# Patient Record
Sex: MALE | Race: WHITE | NOT HISPANIC OR LATINO | URBAN - METROPOLITAN AREA
[De-identification: names, ages, dates, MRNs, and addresses within clinical notes are randomized per-mention and may not be internally consistent; named-entity substitution may affect disease eponyms.]

---

## 2018-07-09 ENCOUNTER — APPOINTMENT (OUTPATIENT)
Dept: GENERAL RADIOLOGY | Facility: CLINIC | Age: 48
End: 2018-07-09
Attending: PHYSICIAN ASSISTANT
Payer: COMMERCIAL

## 2018-07-09 ENCOUNTER — HOSPITAL ENCOUNTER (EMERGENCY)
Facility: CLINIC | Age: 48
Discharge: HOME | End: 2018-07-09
Attending: PHYSICIAN ASSISTANT | Admitting: PHYSICIAN ASSISTANT
Payer: COMMERCIAL

## 2018-07-09 VITALS
BODY MASS INDEX: 32.9 KG/M2 | TEMPERATURE: 99.3 F | OXYGEN SATURATION: 100 % | HEIGHT: 71 IN | SYSTOLIC BLOOD PRESSURE: 153 MMHG | WEIGHT: 235 LBS | DIASTOLIC BLOOD PRESSURE: 100 MMHG | RESPIRATION RATE: 18 BRPM

## 2018-07-09 DIAGNOSIS — T18.108A ESOPHAGEAL FOREIGN BODY, INITIAL ENCOUNTER: ICD-10-CM

## 2018-07-09 PROCEDURE — 71046 X-RAY EXAM CHEST 2 VIEWS: CPT

## 2018-07-09 PROCEDURE — 70360 X-RAY EXAM OF NECK: CPT

## 2018-07-09 PROCEDURE — 99284 EMERGENCY DEPT VISIT MOD MDM: CPT | Mod: 25

## 2018-07-09 PROCEDURE — 99284 EMERGENCY DEPT VISIT MOD MDM: CPT

## 2018-07-09 ASSESSMENT — ENCOUNTER SYMPTOMS
FEVER: 0
CHILLS: 0
VOMITING: 0

## 2018-07-09 NOTE — ED AVS SNAPSHOT
[facility]    [address]    [facility] [address]    Phone:  [phone]    Fax:  [phone]                                       Candido Young   MRN: [id number]    Department:  [facility]   Date of Visit:  7/9/2018           Patient Information     Date Of Birth          1970        Your diagnoses for this visit were:     Esophageal foreign body, initial encounter        You were seen by Eden Leal PA-C.      Follow-up Information     Follow up with Matt Keller MD.    Specialty:  Gastroenterology    Why:  Expect a call tomorrow. Plan for procdure here.    Contact information:    [facility]  [address]  [facility] [address]  [phone]          Follow up with [facility].    Specialty:  EMERGENCY MEDICINE    Why:  If symptoms worsen    Contact information:    [address] [facility] [id number] [phone]        Discharge Instructions       Clear fluids for breakfast tomorrow. Nothing after 7am tomorrow. Expect a call early tomorrow to set up a time for removal. Call the clinic if you do not receive a call for some reason. Return here if you develop a fever, trouble breathing, or any other concerns.       Swallowed Foreign Body (Adult)  Indigestible objects (foreign bodies) are sometimes swallowed by adults. Whether or not the object moves all the way through the digestive tract depends on many factors. This includes the size and shape of the object, whether the object is sharp and pointy, and what the object is made of.  Based on your evaluation, no treatment is needed at this time. The swallowed object is expected to move through your digestive tract and pass out of the body in the stool with no problems. This may take about 24 to 48 hours, but could take longer depending on your bowel habits. If imaging tests were done, you will  be told when the results are ready and if they affect your treatment.  Home care    Follow any instructions from your provider about eating and drinking. In certain cases, you may be told to only eat soft foods and drink liquids for the first 24 to 48 hours.    You will need to check your stool each time you have a bowel movement. This is so you can confirm that the object has passed, and look for signs of bleeding. If the object does not pass, it may mean that the object is stuck somewhere along the digestive tract. In such cases, the object may need to be removed with a procedure.  Follow-up care  Follow up with your healthcare provider as advised. You will be told if further treatment is needed. In certain cases, you may need to return to have imaging tests done. Call your healthcare provider if you have any questions or concerns.  When to seek medical advice  Call your healthcare provider right away if any of these occur:    Belly pain, cramps, or swelling    Shortness of breath or coughing that won t stop    Trouble swallowing or pain with swallowing    Vomiting that won t stop    Blood in the stool (dark red or black color)    Fever of 100.4 F (38 C) or higher, or as directed by your provider  Call 911  Call 911 if any of these occur:    Trouble breathing, wheezing    Trouble speaking    Unusually fast heart rate    New or worsening chest pain    Vomiting blood (red or black)    Swelling of the neck    Inability to pass stool  Date Last Reviewed: 8/1/2017 2000-2017 [employer]. [address] [place] [address]. All rights reserved. This information is not intended as a substitute for professional medical care. Always follow your healthcare professional's instructions.          24 Hour Appointment Hotline       To make an appointment at any [place] clinic, call [phone] ([phone]). If you don't have a family doctor or clinic, we will help you find one. [facility] are  conveniently located to serve the needs of you and your family.             Review of your medicines      Our records show that you are taking the medicines listed below. If these are incorrect, please call your family doctor or clinic.        Dose / Directions Last dose taken    MULTI-VITAMIN DAILY PO        Take  by mouth daily.   Refills:  0        NO ACTIVE MEDICATIONS        .   Refills:  0        OMEGA-3 FISH OIL PO        Take by mouth daily   Refills:  0                Procedures and tests performed during your visit     Chest XR,  PA & LAT    Neck soft tissue XR      Orders Needing Specimen Collection     None      Pending Results     No orders found from 7/7/2018 to 7/10/2018.            Pending Culture Results     No orders found from 7/7/2018 to 7/10/2018.            Pending Results Instructions     If you had any lab results that were not finalized at the time of your Discharge, you can call the ED Lab Result RN at [phone]. You will be contacted by this team for any positive Lab results or changes in treatment. The nurses are available 7 days a week from 10A to 6:30P.  You can leave a message 24 hours per day and they will return your call.        Test Results From Your Hospital Stay        7/9/2018  6:03 PM      Narrative     XR CHEST TWO VIEWS 7/9/2018 4:39 PM     HISTORY: Thinks he swallowed fish bone 10 days ago.    COMPARISON: 2/4/2010        Impression     IMPRESSION: The cardiac silhouette and pulmonary vasculature are  normal. The lungs are clear. No radiopaque foreign body is identified.    ANDRAE HNEDRICKSON MD         7/9/2018  6:03 PM      Narrative     XR NECK SOFT TISSUE   7/9/2018 4:39 PM     HISTORY: Thinks he swallowed fish bone 10 days ago.    COMPARISON: None.        Impression     IMPRESSION: There is a linear opacity measuring 9 mm in length in the  projection of the esophagus, anterior to C7. This could be related to  the thyroid cartilage but a foreign body cannot be excluded.  The  airway is normal in caliber.    ANDRAE HENDRICKSON MD                Clinical Quality Measure: Blood Pressure Screening     Your blood pressure was checked while you were in the emergency department today. The last reading we obtained was  BP: (!) 153/100 . Please read the guidelines below about what these numbers mean and what you should do about them.  If your systolic blood pressure (the top number) is less than 120 and your diastolic blood pressure (the bottom number) is less than 80, then your blood pressure is normal. There is nothing more that you need to do about it.  If your systolic blood pressure (the top number) is 120-139 or your diastolic blood pressure (the bottom number) is 80-89, your blood pressure may be higher than it should be. You should have your blood pressure rechecked within a year by a primary care provider.  If your systolic blood pressure (the top number) is 140 or greater or your diastolic blood pressure (the bottom number) is 90 or greater, you may have high blood pressure. High blood pressure is treatable, but if left untreated over time it can put you at risk for heart attack, stroke, or kidney failure. You should have your blood pressure rechecked by a primary care provider within the next 4 weeks.  If your provider in the emergency department today gave you specific instructions to follow-up with your doctor or provider even sooner than that, you should follow that instruction and not wait for up to 4 weeks for your follow-up visit.        Thank you for choosing [facility]       Thank you for choosing [facility] for your care. Our goal is always to provide you with excellent care. Hearing back from our patients is one way we can continue to improve our services. Please take a few minutes to complete the written survey that you may receive in the mail after you visit with us. Thank you!        [employer] Information     [employer] lets you send messages to your doctor, view your test  "results, renew your prescriptions, schedule appointments and more. To sign up, go to [email or web address] . Click on \"Log in\" on the left side of the screen, which will take you to the Welcome page. Then click on \"Sign up Now\" on the right side of the page.     You will be asked to enter the access code listed below, as well as some personal information. Please follow the directions to create your username and password.     Your access code is: [email or web address]  Expires: 10/7/2018  6:48 PM     Your access code will  in 90 days. If you need help or a new code, please call your [facility] clinic or [phone].        Care EveryWhere ID     This is your Care EveryWhere ID. This could be used by other organizations to access your [facility] medical records  [id number]        Equal Access to Services     [facility] Hospital[facility] : Hadskylar Astudillo, wapaulie brown, qaleeroy kaalmatanvi santoyo, jaimie gaytan . So [facility] [phone].    ATENCIÓN: Si habla español, tiene a patterson disposición servicios gratuitos de asistencia lingüística. Llame al [phone].    We comply with applicable federal civil rights laws and [place] laws. We do not discriminate on the basis of race, color, national origin, age, disability, sex, sexual orientation, or gender identity.            After Visit Summary       This is your record. Keep this with you and show to your community pharmacist(s) and doctor(s) at your next visit.                  "

## 2018-07-09 NOTE — ED PROVIDER NOTES
"  History     Chief Complaint:  Swallowed Foreign Body      HPI   Candido Young is a 47 year old male who presents to the emergency department for evaluation of swallowing a foreign body. The patient reports that 10 days ago he was fishing in [place] when he ate some fish that was prepared by a friend. While he was eating, he felt two fish bones in his mouth which he removed, but when he swallowed he felt a sharp pain in his throat which he believes was a fish bone. For several days he tried swallowing \"hard\" and eating food that he hoped would get the bone unstuck with no improvement. Four days ago, he reports that when he swallowed he felt as if the bone moved farther down his throat and is now stuck at the base of his throat near his chest. The pain is not sharp anymore but when he breathes in he can \"feel something in there\" which is new and prompted him to come to the ED. He is tolerating foods and fluids. The patient denies a fever/chills, vomiting, taking any medications or any other acute symptoms.     Allergies:  No Known Allergies     Medications:    The patient is not currently taking any prescribed medications.    Past Medical History:    History reviewed.  No significant past medical history.     Past Surgical History:    History reviewed. No pertinent past surgical history.    Family History:    Diabetes  Hypertension  Prostate cancer  Breast cancer    Social History:  Former Smoker   Positive for alcohol use.   Marital Status:        Review of Systems   Constitutional: Negative for chills and fever.   HENT:        Positive for throat pain.    Gastrointestinal: Negative for vomiting.   All other systems reviewed and are negative.    Physical Exam   First Vitals:  BP: (!) 153/100  Heart Rate: 54  Temp: 99.3  F (37.4  C)  Resp: 18  Height: 180.3 cm (5' 11\")  Weight: 106.6 kg (235 lb)  SpO2: 100 %      Physical Exam    General: Resting comfortably.  Alert and oriented.   Head:  The scalp, face, and " head appear normal   Eyes:  Conjunctivae and sclerae are normal    ENT:    The oropharynx is normal    Uvula is in the midline     Structures are symmetric. No foreign body noted.    Neck:  No lymphadenopathy  CV:  Regular rate and rhythm     Normal S1/S2    No pathological murmur detected   Resp:  Lungs are clear to auscultation    No rales or wheezing     Equal air entry throughout, no tachypnea, no respiratory distress.  GI:  Abdomen is soft, non-distended    No abdominal tenderness   MS:  Normal muscular tone   Skin:  No rash or acute skin lesions noted   Neuro: Speech is normal and fluent.     Emergency Department Course   Imaging:  Chest X-Ray, PA & LAT, 2 Views:   IMPRESSION: The cardiac silhouette and pulmonary vasculature are  normal. The lungs are clear. No radiopaque foreign body is identified.  Reading per radiology.     Neck Soft Tissue X-Ray:  IMPRESSION: There is a linear opacity measuring 9 mm in length in the  projection of the esophagus, anterior to C7. This could be related to  the thyroid cartilage but a foreign body cannot be excluded. The  airway is normal in caliber.  Reading per radiology.     Radiographic findings were communicated with the patient who voiced understanding of the findings.    Emergency Department Course:  1628 Nursing notes and vitals reviewed. I performed an exam of the patient as documented above.     The patient was sent for a chest XR and a soft tissue neck XR while in the emergency department, findings above.     1719 I rechecked the patient and discussed the results of his workup thus far.     1753 I consulted with MEERA Verdugo, regarding the patient's history and presentation here in the emergency department.    1816 I consulted with Dr. Vanegas ENT, regarding the patient's history and presentation here in the emergency department.    1837 I consulted with MEERA Verdugo, regarding the patient's history and presentation here in the emergency department.    Findings  and plan explained to the Patient. Patient discharged home with instructions regarding supportive care, medications, and reasons to return. The importance of close follow-up was reviewed.     Impression & Plan    Medical Decision Making:  Hector is a 47 year old male who presents for evaluation of a foreign body sensation. He states 10 days ago he was eating fish when he felt a tightness in his throat and thought it was a fish bone. He has had this discomfort since. He is able to tolerate PO. He appears well. He is afebrile and otherwise feeling fine. XR chest and soft tissue of the neck was obtained and demonstrated a linear 9 mm opacity in the esophagus anterior to C7. Given the patient's symptoms, this is likely related to the incident 10 days ago. Therefore, Dr. Keller of [facility] was contacted. He suggests that I call ENT, given concern for possible inability to locate the object with a flexible scope, therefore, Dr. Vanegas of ENT was contacted and then had me call Dr. Keller with a plan for GI to start the procedure with ENT to intervene if needed. Therefore, Dr. Keller of  was contacted again and he agrees to perform esophageal foreign body removal tomorrow given his well appearance. He states that his clinic will call him tomorrow to set up a time for the procedure tomorrow. He was instructed to only drink clear liquids for breakfast and no food or liquids after 0700. He was discharged home. He was asked to report as noted above. He was asked to return immediately with fever, trouble swallowing, trouble breathing, or any other concern. All questions were answered prior to discharge. The patient understands and agrees to this plan.    Diagnosis:    ICD-10-CM    1. Esophageal foreign body, initial encounter T18.108A        Disposition:  discharged to home    Scribe Disclosure:  Jonny SY, am serving as a scribe on 7/9/2018 at 4:28 PM to personally document services performed by Eden  KAREN Leal, based on my observations and the provider's statements to me.       Jonny Odonnell  7/9/2018   [facility] EMERGENCY DEPARTMENT       Eden Leal PA-C  07/09/18 2005

## 2018-07-09 NOTE — ED TRIAGE NOTES
Pt has sensation of fish bone in his throat which has been present for past 10 days.  He is able to eat and drink but sensation persists.

## 2018-07-09 NOTE — ED AVS SNAPSHOT
[facility]    [address]    [facility] [address]    Phone:  [phone]    Fax:  [phone]                                       Candido Young   MRN: [id number]    Department:  [facility]   Date of Visit:  7/9/2018           After Visit Summary Signature Page     I have received my discharge instructions, and my questions have been answered. I have discussed any challenges I see with this plan with the nurse or doctor.    ..........................................................................................................................................  Patient/Patient Representative Signature      ..........................................................................................................................................  Patient Representative Print Name and Relationship to Patient    ..................................................               ................................................  Date                                            Time    ..........................................................................................................................................  Reviewed by Signature/Title    ...................................................              ..............................................  Date                                                            Time

## 2018-07-10 ENCOUNTER — HOSPITAL ENCOUNTER (OUTPATIENT)
Facility: CLINIC | Age: 48
Discharge: HOME | End: 2018-07-10
Attending: INTERNAL MEDICINE | Admitting: INTERNAL MEDICINE
Payer: COMMERCIAL

## 2018-07-10 VITALS
RESPIRATION RATE: 6 BRPM | SYSTOLIC BLOOD PRESSURE: 126 MMHG | DIASTOLIC BLOOD PRESSURE: 83 MMHG | OXYGEN SATURATION: 95 %

## 2018-07-10 LAB — UPPER GI ENDOSCOPY: NORMAL

## 2018-07-10 PROCEDURE — G0500 MOD SEDAT ENDO SERVICE >5YRS: HCPCS

## 2018-07-10 PROCEDURE — 25000128 H RX IP 250 OP 636: Performed by: INTERNAL MEDICINE

## 2018-07-10 PROCEDURE — 25000125 ZZHC RX 250: Performed by: INTERNAL MEDICINE

## 2018-07-10 PROCEDURE — 40000104 ZZH STATISTIC MODERATE SEDATION < 10 MIN: Performed by: INTERNAL MEDICINE

## 2018-07-10 PROCEDURE — 43235 EGD DIAGNOSTIC BRUSH WASH: CPT | Performed by: INTERNAL MEDICINE

## 2018-07-10 RX ORDER — FLUMAZENIL 0.1 MG/ML
0.2 INJECTION, SOLUTION INTRAVENOUS
Status: DISCONTINUED | OUTPATIENT
Start: 2018-07-10 | End: 2018-07-10 | Stop reason: HOSPADM

## 2018-07-10 RX ORDER — ONDANSETRON 2 MG/ML
4 INJECTION INTRAMUSCULAR; INTRAVENOUS EVERY 6 HOURS PRN
Status: DISCONTINUED | OUTPATIENT
Start: 2018-07-10 | End: 2018-07-10 | Stop reason: HOSPADM

## 2018-07-10 RX ORDER — ONDANSETRON 4 MG/1
4 TABLET, ORALLY DISINTEGRATING ORAL EVERY 6 HOURS PRN
Status: DISCONTINUED | OUTPATIENT
Start: 2018-07-10 | End: 2018-07-10 | Stop reason: HOSPADM

## 2018-07-10 RX ORDER — FENTANYL CITRATE 50 UG/ML
INJECTION, SOLUTION INTRAMUSCULAR; INTRAVENOUS PRN
Status: DISCONTINUED | OUTPATIENT
Start: 2018-07-10 | End: 2018-07-10 | Stop reason: HOSPADM

## 2018-07-10 RX ORDER — NALOXONE HYDROCHLORIDE 0.4 MG/ML
.1-.4 INJECTION, SOLUTION INTRAMUSCULAR; INTRAVENOUS; SUBCUTANEOUS
Status: DISCONTINUED | OUTPATIENT
Start: 2018-07-10 | End: 2018-07-10 | Stop reason: HOSPADM

## 2018-07-10 RX ORDER — DIPHENHYDRAMINE HYDROCHLORIDE 50 MG/ML
INJECTION INTRAMUSCULAR; INTRAVENOUS PRN
Status: DISCONTINUED | OUTPATIENT
Start: 2018-07-10 | End: 2018-07-10 | Stop reason: HOSPADM

## 2018-07-10 NOTE — DISCHARGE INSTRUCTIONS
Clear fluids for breakfast tomorrow. Nothing after 7am tomorrow. Expect a call early tomorrow to set up a time for removal. Call the clinic if you do not receive a call for some reason. Return here if you develop a fever, trouble breathing, or any other concerns.       Swallowed Foreign Body (Adult)  Indigestible objects (foreign bodies) are sometimes swallowed by adults. Whether or not the object moves all the way through the digestive tract depends on many factors. This includes the size and shape of the object, whether the object is sharp and pointy, and what the object is made of.  Based on your evaluation, no treatment is needed at this time. The swallowed object is expected to move through your digestive tract and pass out of the body in the stool with no problems. This may take about 24 to 48 hours, but could take longer depending on your bowel habits. If imaging tests were done, you will be told when the results are ready and if they affect your treatment.  Home care    Follow any instructions from your provider about eating and drinking. In certain cases, you may be told to only eat soft foods and drink liquids for the first 24 to 48 hours.    You will need to check your stool each time you have a bowel movement. This is so you can confirm that the object has passed, and look for signs of bleeding. If the object does not pass, it may mean that the object is stuck somewhere along the digestive tract. In such cases, the object may need to be removed with a procedure.  Follow-up care  Follow up with your healthcare provider as advised. You will be told if further treatment is needed. In certain cases, you may need to return to have imaging tests done. Call your healthcare provider if you have any questions or concerns.  When to seek medical advice  Call your healthcare provider right away if any of these occur:    Belly pain, cramps, or swelling    Shortness of breath or coughing that won t stop    Trouble  swallowing or pain with swallowing    Vomiting that won t stop    Blood in the stool (dark red or black color)    Fever of 100.4 F (38 C) or higher, or as directed by your provider  Call 911  Call 911 if any of these occur:    Trouble breathing, wheezing    Trouble speaking    Unusually fast heart rate    New or worsening chest pain    Vomiting blood (red or black)    Swelling of the neck    Inability to pass stool  Date Last Reviewed: 8/1/2017 2000-2017 [employer]. [address] [place] [address]. All rights reserved. This information is not intended as a substitute for professional medical care. Always follow your healthcare professional's instructions.

## 2018-07-10 NOTE — IP AVS SNAPSHOT
MRN:[id number]                      After Visit Summary   7/10/2018    Candido Young    MRN: [id number]           Thank you!     Thank you for choosing [facility] for your care. Our goal is always to provide you with excellent care. Hearing back from our patients is one way we can continue to improve our services. Please take a few minutes to complete the written survey that you may receive in the mail after you visit. If you would like to speak to someone directly about your visit please contact Patient Relations at [phone]. Thank you!          Patient Information     Date Of Birth          1970        About your hospital stay     You were admitted on:  July 10, 2018 You last received care in the:  [facility]    You were discharged on:  July 10, 2018       Who to Call     For medical emergencies, please call 911.  For non-urgent questions about your medical care, please call your primary care provider or clinic, [phone]  For questions related to your surgery, please call your surgery clinic        Attending Provider     Provider Specialty    Link, Kelvin SEGURA MD Gastroenterology       Primary Care Provider Office Phone # Fax #    [facility] [phone]      Further instructions from your care team       The patient has received a copy of the Provation  report the doctor has written and discharge instructions have been discussed with the patient and responsible adult.  All questions were addressed and answered prior to patient discharge.    Pending Results     No orders found from 7/8/2018 to 7/11/2018.            Admission Information     Date & Time Provider Department Dept. Phone    7/10/2018 Link, Kelvin SEGURA MD [facility] [phone]      Your Vitals Were     Blood Pressure Respirations Pulse Oximetry             142/85 16 94%         MyChart Information     [employer] lets you send messages to your doctor, view  "your test results, renew your prescriptions, schedule appointments and more. To sign up, go to [email or web address] . Click on \"Log in\" on the left side of the screen, which will take you to the Welcome page. Then click on \"Sign up Now\" on the right side of the page.     You will be asked to enter the access code listed below, as well as some personal information. Please follow the directions to create your username and password.     Your access code is: [email or web address]  Expires: 10/7/2018  6:48 PM     Your access code will  in 90 days. If you need help or a new code, please call your [facility] clinic or [phone].        Care EveryWhere ID     This is your Care EveryWhere ID. This could be used by other organizations to access your [facility] medical records  [id number]        Equal Access to Services     [facility] : Hadskylar Astudillo, wapaulie brown, eryn singhalcydney santoyo, jaimie gaytan . So [facility] [phone].    ATENCIÓN: Si habla español, tiene a patterson disposición servicios gratuitos de asistencia lingüística. Llame al [phone].    We comply with applicable federal civil rights laws and [place] laws. We do not discriminate on the basis of race, color, national origin, age, disability, sex, sexual orientation, or gender identity.               Review of your medicines      UNREVIEWED medicines. Ask your doctor about these medicines        Dose / Directions    MULTI-VITAMIN DAILY PO        Take  by mouth daily.   Refills:  0       OMEGA-3 FISH OIL PO        Take by mouth daily   Refills:  0         CONTINUE these medicines which have NOT CHANGED        Dose / Directions    NO ACTIVE MEDICATIONS   Used for:  Other general medical examination for administrative purposes        .   Refills:  0                Protect others around you: Learn how to safely use, store and throw away your medicines at www.disposemymeds.org.             Medication List: This is a " list of all your medications and when to take them. Check marks below indicate your daily home schedule. Keep this list as a reference.      Medications           Morning Afternoon Evening Bedtime As Needed    MULTI-VITAMIN DAILY PO   Take  by mouth daily.                                NO ACTIVE MEDICATIONS   .                                OMEGA-3 FISH OIL PO   Take by mouth daily

## 2018-07-10 NOTE — H&P
"Pre-Endoscopy History and Physical     Candido Young MRN# [id number]   YOB: 1970 Age: 47 year old     Date of Procedure: 7/10/2018  Primary care provider: Davion Forrest  Type of Endoscopy: esophagogastroduodenoscopy (upper GI endoscopy)  Reason for Procedure: Foreign body  Type of Anesthesia Anticipated: Conscious Sedation    HPI:    Candido is a 47 year old male who will be undergoing the above procedure.      A history and physical has been performed. The patient's medications and allergies have been reviewed. The risks and benefits of the procedure and the sedation options and risks were discussed with the patient.  All questions were answered and informed consent was obtained.      He denies a personal or family history of anesthesia complications or bleeding disorders.     Patient Active Problem List   Diagnosis     NO ACTIVE PROBLEMS     CARDIOVASCULAR SCREENING; LDL GOAL LESS THAN 160        Past Medical History:   Diagnosis Date     NO ACTIVE PROBLEMS         Past Surgical History:   Procedure Laterality Date     NO HISTORY OF SURGERY         Relevant Family History: NONE    Relevant Social History: NONE     Prior to Admission medications    Medication Sig Start Date End Date Taking? Authorizing Provider   Multiple Vitamin (MULTI-VITAMIN DAILY PO) Take  by mouth daily.   Yes Reported, Patient   Omega-3 Fatty Acids (OMEGA-3 FISH OIL PO) Take by mouth daily   Yes Reported, Patient   NO ACTIVE MEDICATIONS .    Reported, Patient       No Known Allergies     REVIEW OF SYSTEMS:   A relevant review of systems was performed and was negative    PHYSICAL EXAM:   There were no vitals taken for this visit. Estimated body mass index is 32.78 kg/(m^2) as calculated from the following:    Height as of 7/9/18: 1.803 m (5' 11\").    Weight as of 7/9/18: 106.6 kg (235 lb).   GENERAL APPEARANCE: alert, and oriented  MENTAL STATUS: alert  AIRWAY EXAM: Normal  RESP: lungs clear to auscultation - no " rales, rhonchi or wheezes  CV: regular rates and rhythm  DIAGNOSTICS:    Not indicated    IMPRESSION   ASA Class 2 - Mild systemic disease    PLAN:   Plan for EGD. We discussed the risks, benefits and alternatives and the patient wished to proceed.      Signed Electronically by: Kelvin Zarate  July 10, 2018

## 2020-07-20 ENCOUNTER — OFFICE VISIT (OUTPATIENT)
Dept: FAMILY MEDICINE | Facility: CLINIC | Age: 50
End: 2020-07-20
Payer: COMMERCIAL

## 2020-07-20 VITALS
OXYGEN SATURATION: 97 % | SYSTOLIC BLOOD PRESSURE: 128 MMHG | BODY MASS INDEX: 34.16 KG/M2 | RESPIRATION RATE: 18 BRPM | DIASTOLIC BLOOD PRESSURE: 84 MMHG | HEIGHT: 71 IN | HEART RATE: 85 BPM | WEIGHT: 244 LBS | TEMPERATURE: 99 F

## 2020-07-20 DIAGNOSIS — Z00.00 ROUTINE GENERAL MEDICAL EXAMINATION AT A HEALTH CARE FACILITY: Primary | ICD-10-CM

## 2020-07-20 LAB
ERYTHROCYTE [DISTWIDTH] IN BLOOD BY AUTOMATED COUNT: 12.8 % (ref 10–15)
HBA1C MFR BLD: 5.4 % (ref 0–5.6)
HCT VFR BLD AUTO: 45.3 % (ref 40–53)
HGB BLD-MCNC: 15.2 G/DL (ref 13.3–17.7)
MCH RBC QN AUTO: 30.6 PG (ref 26.5–33)
MCHC RBC AUTO-ENTMCNC: 33.6 G/DL (ref 31.5–36.5)
MCV RBC AUTO: 91 FL (ref 78–100)
PLATELET # BLD AUTO: 252 10E9/L (ref 150–450)
RBC # BLD AUTO: 4.97 10E12/L (ref 4.4–5.9)
WBC # BLD AUTO: 8.5 10E9/L (ref 4–11)

## 2020-07-20 PROCEDURE — 36415 COLL VENOUS BLD VENIPUNCTURE: CPT | Performed by: FAMILY MEDICINE

## 2020-07-20 PROCEDURE — 80061 LIPID PANEL: CPT | Performed by: FAMILY MEDICINE

## 2020-07-20 PROCEDURE — G0103 PSA SCREENING: HCPCS | Performed by: FAMILY MEDICINE

## 2020-07-20 PROCEDURE — 80053 COMPREHEN METABOLIC PANEL: CPT | Performed by: FAMILY MEDICINE

## 2020-07-20 PROCEDURE — 85027 COMPLETE CBC AUTOMATED: CPT | Performed by: FAMILY MEDICINE

## 2020-07-20 PROCEDURE — 83036 HEMOGLOBIN GLYCOSYLATED A1C: CPT | Performed by: FAMILY MEDICINE

## 2020-07-20 PROCEDURE — 99386 PREV VISIT NEW AGE 40-64: CPT | Performed by: FAMILY MEDICINE

## 2020-07-20 ASSESSMENT — ENCOUNTER SYMPTOMS
CONSTIPATION: 0
DIARRHEA: 0
COUGH: 0
CHILLS: 0
DIZZINESS: 0
FEVER: 0
NERVOUS/ANXIOUS: 0
HEMATURIA: 0
EYE PAIN: 0
ABDOMINAL PAIN: 0
FREQUENCY: 0
HEMATOCHEZIA: 1

## 2020-07-20 ASSESSMENT — MIFFLIN-ST. JEOR: SCORE: 2003.91

## 2020-07-20 NOTE — LETTER
July 22, 2020      Candido Young  [address] [address] [address]        Dear ,    We are writing to inform you of your test results.    Your test results fall within the expected range(s) or remain unchanged from previous results.  Please continue with current treatment plan.    Resulted Orders   Hemoglobin A1c   Result Value Ref Range    Hemoglobin A1C 5.4 0 - 5.6 %      Comment:      Normal <5.7% Prediabetes 5.7-6.4%  Diabetes 6.5% or higher - adopted from ADA   consensus guidelines.     PSA, screen   Result Value Ref Range    PSA 0.64 0 - 4 ug/L      Comment:      Assay Method:  Chemiluminescence using Siemens Vista analyzer   Comprehensive metabolic panel (BMP + Alb, Alk Phos, ALT, AST, Total. Bili, TP)   Result Value Ref Range    Sodium 137 133 - 144 mmol/L    Potassium 4.1 3.4 - 5.3 mmol/L    Chloride 106 94 - 109 mmol/L    Carbon Dioxide 27 20 - 32 mmol/L    Anion Gap 4 3 - 14 mmol/L    Glucose 80 70 - 99 mg/dL      Comment:      Fasting specimen    Urea Nitrogen 13 7 - 30 mg/dL    Creatinine 1.02 0.66 - 1.25 mg/dL    GFR Estimate 87 >60 mL/min/[1.73_m2]      Comment:      Non  GFR Calc  Starting 12/18/2018, serum creatinine based estimated GFR (eGFR) will be   calculated using the Chronic Kidney Disease Epidemiology Collaboration   (CKD-EPI) equation.      GFR Estimate If Black >90 >60 mL/min/[1.73_m2]      Comment:       GFR Calc  Starting 12/18/2018, serum creatinine based estimated GFR (eGFR) will be   calculated using the Chronic Kidney Disease Epidemiology Collaboration   (CKD-EPI) equation.      Calcium 9.1 8.5 - 10.1 mg/dL    Bilirubin Total 1.6 (H) 0.2 - 1.3 mg/dL    Albumin 4.0 3.4 - 5.0 g/dL    Protein Total 7.9 6.8 - 8.8 g/dL    Alkaline Phosphatase 81 40 - 150 U/L    ALT 40 0 - 70 U/L    AST 17 0 - 45 U/L   CBC with platelets   Result Value Ref Range    WBC 8.5 4.0 - 11.0 10e9/L    RBC Count 4.97 4.4 - 5.9 10e12/L    Hemoglobin 15.2 13.3 -  17.7 g/dL    Hematocrit 45.3 40.0 - 53.0 %    MCV 91 78 - 100 fl    MCH 30.6 26.5 - 33.0 pg    MCHC 33.6 31.5 - 36.5 g/dL    RDW 12.8 10.0 - 15.0 %    Platelet Count 252 150 - 450 10e9/L   Lipid panel reflex to direct LDL Non-fasting   Result Value Ref Range    Cholesterol 169 <200 mg/dL    Triglycerides 108 <150 mg/dL      Comment:      Fasting specimen    HDL Cholesterol 76 >39 mg/dL    LDL Cholesterol Calculated 71 <100 mg/dL      Comment:      Desirable:       <100 mg/dl    Non HDL Cholesterol 93 <130 mg/dL       If you have any questions or concerns, please call the clinic at the number listed above.       Sincerely,      Yannick Terrazas MD

## 2020-07-20 NOTE — LETTER
July 22, 2020      Candido Young  [address] [address] [address]        Dear ,    We are writing to inform you of your test results.    Your test results fall within the expected range(s) or remain unchanged from previous results.  Please continue with current treatment plan.  Resulted Orders   Hemoglobin A1c   Result Value Ref Range    Hemoglobin A1C 5.4 0 - 5.6 %      Comment:      Normal <5.7% Prediabetes 5.7-6.4%  Diabetes 6.5% or higher - adopted from ADA   consensus guidelines.     PSA, screen   Result Value Ref Range    PSA 0.64 0 - 4 ug/L      Comment:      Assay Method:  Chemiluminescence using Siemens Vista analyzer   Comprehensive metabolic panel (BMP + Alb, Alk Phos, ALT, AST, Total. Bili, TP)   Result Value Ref Range    Sodium 137 133 - 144 mmol/L    Potassium 4.1 3.4 - 5.3 mmol/L    Chloride 106 94 - 109 mmol/L    Carbon Dioxide 27 20 - 32 mmol/L    Anion Gap 4 3 - 14 mmol/L    Glucose 80 70 - 99 mg/dL      Comment:      Fasting specimen    Urea Nitrogen 13 7 - 30 mg/dL    Creatinine 1.02 0.66 - 1.25 mg/dL    GFR Estimate 87 >60 mL/min/[1.73_m2]      Comment:      Non  GFR Calc  Starting 12/18/2018, serum creatinine based estimated GFR (eGFR) will be   calculated using the Chronic Kidney Disease Epidemiology Collaboration   (CKD-EPI) equation.      GFR Estimate If Black >90 >60 mL/min/[1.73_m2]      Comment:       GFR Calc  Starting 12/18/2018, serum creatinine based estimated GFR (eGFR) will be   calculated using the Chronic Kidney Disease Epidemiology Collaboration   (CKD-EPI) equation.      Calcium 9.1 8.5 - 10.1 mg/dL    Bilirubin Total 1.6 (H) 0.2 - 1.3 mg/dL    Albumin 4.0 3.4 - 5.0 g/dL    Protein Total 7.9 6.8 - 8.8 g/dL    Alkaline Phosphatase 81 40 - 150 U/L    ALT 40 0 - 70 U/L    AST 17 0 - 45 U/L   CBC with platelets   Result Value Ref Range    WBC 8.5 4.0 - 11.0 10e9/L    RBC Count 4.97 4.4 - 5.9 10e12/L    Hemoglobin 15.2 13.3 - 17.7  g/dL    Hematocrit 45.3 40.0 - 53.0 %    MCV 91 78 - 100 fl    MCH 30.6 26.5 - 33.0 pg    MCHC 33.6 31.5 - 36.5 g/dL    RDW 12.8 10.0 - 15.0 %    Platelet Count 252 150 - 450 10e9/L   Lipid panel reflex to direct LDL Non-fasting   Result Value Ref Range    Cholesterol 169 <200 mg/dL    Triglycerides 108 <150 mg/dL      Comment:      Fasting specimen    HDL Cholesterol 76 >39 mg/dL    LDL Cholesterol Calculated 71 <100 mg/dL      Comment:      Desirable:       <100 mg/dl    Non HDL Cholesterol 93 <130 mg/dL   If you have any questions or concerns, please call the clinic at the number listed above.       Sincerely,        Yannick Terrazas MD

## 2020-07-21 LAB
ALBUMIN SERPL-MCNC: 4 G/DL (ref 3.4–5)
ALP SERPL-CCNC: 81 U/L (ref 40–150)
ALT SERPL W P-5'-P-CCNC: 40 U/L (ref 0–70)
ANION GAP SERPL CALCULATED.3IONS-SCNC: 4 MMOL/L (ref 3–14)
AST SERPL W P-5'-P-CCNC: 17 U/L (ref 0–45)
BILIRUB SERPL-MCNC: 1.6 MG/DL (ref 0.2–1.3)
BUN SERPL-MCNC: 13 MG/DL (ref 7–30)
CALCIUM SERPL-MCNC: 9.1 MG/DL (ref 8.5–10.1)
CHLORIDE SERPL-SCNC: 106 MMOL/L (ref 94–109)
CHOLEST SERPL-MCNC: 169 MG/DL
CO2 SERPL-SCNC: 27 MMOL/L (ref 20–32)
CREAT SERPL-MCNC: 1.02 MG/DL (ref 0.66–1.25)
GFR SERPL CREATININE-BSD FRML MDRD: 87 ML/MIN/{1.73_M2}
GLUCOSE SERPL-MCNC: 80 MG/DL (ref 70–99)
HDLC SERPL-MCNC: 76 MG/DL
LDLC SERPL CALC-MCNC: 71 MG/DL
NONHDLC SERPL-MCNC: 93 MG/DL
POTASSIUM SERPL-SCNC: 4.1 MMOL/L (ref 3.4–5.3)
PROT SERPL-MCNC: 7.9 G/DL (ref 6.8–8.8)
PSA SERPL-ACNC: 0.64 UG/L (ref 0–4)
SODIUM SERPL-SCNC: 137 MMOL/L (ref 133–144)
TRIGL SERPL-MCNC: 108 MG/DL

## 2021-01-14 ENCOUNTER — HEALTH MAINTENANCE LETTER (OUTPATIENT)
Age: 51
End: 2021-01-14

## 2021-04-22 ENCOUNTER — OFFICE VISIT (OUTPATIENT)
Dept: FAMILY MEDICINE | Facility: CLINIC | Age: 51
End: 2021-04-22
Payer: COMMERCIAL

## 2021-04-22 VITALS
RESPIRATION RATE: 16 BRPM | TEMPERATURE: 99.3 F | BODY MASS INDEX: 35.14 KG/M2 | HEART RATE: 84 BPM | HEIGHT: 71 IN | WEIGHT: 251 LBS | SYSTOLIC BLOOD PRESSURE: 122 MMHG | DIASTOLIC BLOOD PRESSURE: 80 MMHG

## 2021-04-22 DIAGNOSIS — S91.312A LACERATION OF LEFT FOOT, INITIAL ENCOUNTER: Primary | ICD-10-CM

## 2021-04-22 PROCEDURE — 99213 OFFICE O/P EST LOW 20 MIN: CPT | Performed by: FAMILY MEDICINE

## 2021-04-22 RX ORDER — CEPHALEXIN 500 MG/1
500 CAPSULE ORAL 2 TIMES DAILY
Qty: 14 CAPSULE | Refills: 0 | Status: SHIPPED | OUTPATIENT
Start: 2021-04-22 | End: 2021-04-29

## 2021-04-22 ASSESSMENT — ENCOUNTER SYMPTOMS
NUMBNESS: 0
FEVER: 0

## 2021-04-22 ASSESSMENT — MIFFLIN-ST. JEOR: SCORE: 2030.66

## 2021-04-22 NOTE — PATIENT INSTRUCTIONS
Patient Education     Wound Check, No Infection  Your wound is healing as expected. There are no signs of infection.   Home care  Continue to care for your wound as directed.    Cover your wound with a bandage unless your healthcare provider tells you not to.    Gently clean your wound with soap and water when you shower.     Unless told otherwise, don't swim or take tub baths until your wound has healed.  Follow-up care  Follow up with your healthcare provider as advised.    If you have sutures or staples, return as directed to have them removed. If they are not taken out on time, they may be harder to remove and scarring may be worse. Infection may develop.    If surgical tape strips were used, you can remove them yourself if they have not fallen off by 10 days after they were applied.   When to seek medical advice  Call your healthcare provider right away if any of these occur:    Fever of 100.4 F (38 C) or higher, or as directed by your healthcare provider    Symptoms of a wound infection, including:  ? Redness or swelling around the wound  ? Warmth coming from the wound  ? New or worsening pain  ? Red streaks around the wound  ? Draining pus  Dao last reviewed this educational content on 3/1/2018    2014-5753 [employer]. All rights reserved. This information is not intended as a substitute for professional medical care. Always follow your healthcare professional's instructions.

## 2021-04-22 NOTE — PROGRESS NOTES
"    Assessment & Plan     Laceration of left foot, initial encounter  Injury occurred 3 days ago, up-to-date on Tdap, last Tdap 8-3-2016.  The area was soaked in Hibiclens and using a rat tooth forcep, the lesion did not reopen as the laceration has already closed.  Start antibiotics for infection prevention, there is no obvious cellulitis.  Precautions given to patient monitor for fever, spreading redness and puslike discharge; at this occurs he needs to be reevaluated.  Over-the-counter pain medications for control.  - cephALEXin (KEFLEX) 500 MG capsule  Dispense: 14 capsule; Refill: 0        Return in about 3 days (around 4/25/2021) for If symptoms do not improve or gets worse..    Yannick Terrazas MD  [facility]    Up-to-date on tetanus,     Swapnil Rey is a 50 year old who presents for the following health issues     History of Present Illness       He eats 0-1 servings of fruits and vegetables daily.He consumes 0 sweetened beverage(s) daily.He exercises with enough effort to increase his heart rate 30 to 60 minutes per day.  He exercises with enough effort to increase his heart rate 4 days per week.   He is taking medications regularly.       Concern - cut on foot  Onset: 4/19/21  Intensity: mild  Progression of Symptoms:  same  Accompanying Signs & Symptoms: none  Previous history of similar problem: none  Precipitating factors:        Worsened by: none  Alleviating factors:        Improved by: none  Therapies tried and outcome:  none     Patient cut his left big toe with a misstep on stairs when he was vacationing in [facility].  Injury occurred 3 days ago.    Review of Systems   Constitutional: Negative for fever.   Neurological: Negative for numbness.            Objective    /80 (BP Location: Right arm, Patient Position: Sitting, Cuff Size: Adult Large)   Pulse 84   Temp 99.3  F (37.4  C) (Oral)   Resp 16   Ht 1.803 m (5' 11\")   Wt 113.9 kg (251 lb)   BMI 35.01 " kg/m    Body mass index is 35.01 kg/m .  Physical Exam  Skin:     Capillary Refill: Capillary refill takes less than 2 seconds.      Comments: Please see image below   Neurological:      Comments: Distal sensation of the left big toe intact.

## 2021-08-28 ENCOUNTER — HEALTH MAINTENANCE LETTER (OUTPATIENT)
Age: 51
End: 2021-08-28

## 2021-10-23 ENCOUNTER — HEALTH MAINTENANCE LETTER (OUTPATIENT)
Age: 51
End: 2021-10-23

## 2022-01-01 NOTE — PROGRESS NOTES
SUBJECTIVE:   CC: Candido Young is an 49 year old male who presents for preventative health visit.     Healthy Habits:     Getting at least 3 servings of Calcium per day:  NO    Bi-annual eye exam:  Yes    Dental care twice a year:  NO    Sleep apnea or symptoms of sleep apnea:  Sleep apnea    Diet:  Regular (no restrictions)    Frequency of exercise:  2-3 days/week    Duration of exercise:  30-45 minutes    Taking medications regularly:  Yes    Medication side effects:  Not applicable and None    PHQ-2 Total Score: 0    Additional concerns today:  No      Requesting PSA testing, father recently diagnosed with metastatic prostate cancer.    Intermittently notices some blood with the eating cheese.  No known history of milk protein allergy        Today's PHQ-2 Score:   PHQ-2 ( 1999 Pfizer) 7/20/2020   Q1: Little interest or pleasure in doing things 0   Q2: Feeling down, depressed or hopeless 0   PHQ-2 Score 0   Q1: Little interest or pleasure in doing things Not at all   Q2: Feeling down, depressed or hopeless Not at all   PHQ-2 Score 0       Abuse: Current or Past(Physical, Sexual or Emotional)- No  Do you feel safe in your environment? Yes        Social History     Tobacco Use     Smoking status: Former Smoker     Smokeless tobacco: Former User     Quit date: 7/10/2008     Tobacco comment: 2-3 years ago, 2006   Substance Use Topics     Alcohol use: Yes     Comment: 2-3 drinks daily (drinks beer)     If you drink alcohol do you typically have >3 drinks per day or >7 drinks per week? No    Alcohol Use 7/20/2020   Prescreen: >3 drinks/day or >7 drinks/week? Yes   Prescreen: >3 drinks/day or >7 drinks/week? -   AUDIT SCORE  5     AUDIT - Alcohol Use Disorders Identification Test - Reproduced from the World Health Organization Audit 2001 (Second Edition) 7/20/2020   1.  How often do you have a drink containing alcohol? 2 to 3 times a week   2.  How many drinks containing alcohol do you have on a typical day when you  are drinking? 1 or 2   3.  How often do you have five or more drinks on one occasion? Monthly   4.  How often during the last year have you found that you were not able to stop drinking once you had started? Never   5.  How often during the last year have you failed to do what was normally expected of you because of drinking? Never   6.  How often during the last year have you needed a first drink in the morning to get yourself going after a heavy drinking session? Never   7.  How often during the last year have you had a feeling of guilt or remorse after drinking? Never   8.  How often during the last year have you been unable to remember what happened the night before because of your drinking? Never   9.  Have you or someone else been injured because of your drinking? No   10. Has a relative, friend, doctor or other health care worker been concerned about your drinking or suggested you cut down? No   TOTAL SCORE 5       Last PSA:   PSA   Date Value Ref Range Status   03/16/2016 0.55 0 - 4 ug/L Final       Reviewed orders with patient. Reviewed health maintenance and updated orders accordingly - Yes  Lab work is in process  Labs reviewed in EPIC    Reviewed and updated as needed this visit by clinical staff  Tobacco  Allergies  Meds         Reviewed and updated as needed this visit by Provider        Past Medical History:   Diagnosis Date     NO ACTIVE PROBLEMS       Past Surgical History:   Procedure Laterality Date     ESOPHAGOSCOPY, GASTROSCOPY, DUODENOSCOPY (EGD), COMBINED N/A 7/10/2018    Procedure: COMBINED ESOPHAGOSCOPY, GASTROSCOPY, DUODENOSCOPY (EGD);  COMBINED ESOPHAGOSCOPY, GASTROSCOPY, DUODENOSCOPY (EGD) [[id number]]  unknown time that ENT doctor can come to assist today;  Surgeon: Kelvin Zarate MD;  Location: [facility]     NO HISTORY OF SURGERY         Review of Systems   Constitutional: Negative for chills and fever.   HENT: Negative for congestion and ear pain.    Eyes: Negative for pain.    Respiratory: Negative for cough.    Cardiovascular: Negative for chest pain.   Gastrointestinal: Positive for hematochezia. Negative for abdominal pain, constipation and diarrhea.   Genitourinary: Negative for frequency and hematuria.   Neurological: Negative for dizziness.   Psychiatric/Behavioral: The patient is not nervous/anxious.          OBJECTIVE:   Pulse 85   Temp 99  F (37.2  C) (Oral)   SpO2 97%     Physical Exam  Vitals signs reviewed.   Constitutional:       General: He is not in acute distress.     Appearance: Normal appearance. He is not ill-appearing.   HENT:      Head: Normocephalic and atraumatic.      Right Ear: External ear normal.      Left Ear: External ear normal.      Nose: Nose normal.      Mouth/Throat:      Mouth: Mucous membranes are moist.      Pharynx: Oropharynx is clear.   Eyes:      General: No scleral icterus.        Right eye: No discharge.         Left eye: No discharge.      Extraocular Movements: Extraocular movements intact.      Pupils: Pupils are equal, round, and reactive to light.   Neck:      Musculoskeletal: Normal range of motion.      Vascular: No JVD.   Cardiovascular:      Rate and Rhythm: Normal rate and regular rhythm.      Heart sounds: No murmur.   Pulmonary:      Effort: Pulmonary effort is normal. No respiratory distress.      Breath sounds: Normal breath sounds.   Abdominal:      General: Abdomen is flat. Bowel sounds are normal.      Palpations: Abdomen is soft.   Genitourinary:     Comments: Deferred  Musculoskeletal:         General: No swelling.   Lymphadenopathy:      Cervical: No cervical adenopathy.   Skin:     General: Skin is warm.      Capillary Refill: Capillary refill takes less than 2 seconds.   Neurological:      General: No focal deficit present.      Mental Status: He is alert.   Psychiatric:         Mood and Affect: Mood normal.         Behavior: Behavior normal.       Diagnostic Test Results:  Labs reviewed in Epic    ASSESSMENT/PLAN:   1.  "Routine general medical examination at a health care facility  Had a lengthy discussion with patient about PSA testing, he would like to proceed.  - Hemoglobin A1c  - PSA, screen  - Comprehensive metabolic panel (BMP + Alb, Alk Phos, ALT, AST, Total. Bili, TP)  - CBC with platelets  - Lipid panel reflex to direct LDL Non-fasting    COUNSELING:   Reviewed preventive health counseling, as reflected in patient instructions       Regular exercise       Healthy diet/nutrition    Estimated body mass index is 32.78 kg/m  as calculated from the following:    Height as of 7/9/18: 1.803 m (5' 11\").    Weight as of 7/9/18: 106.6 kg (235 lb).     Weight management plan: Discussed healthy diet and exercise guidelines     reports that he has quit smoking. He quit smokeless tobacco use about 12 years ago.      Counseling Resources:  ATP IV Guidelines  Pooled Cohorts Equation Calculator  FRAX Risk Assessment  ICSI Preventive Guidelines  Dietary Guidelines for Americans, 2010  USDA's MyPlate  ASA Prophylaxis  Lung CA Screening    Yannick Terrazas MD  [facility]  " Patient's first and last name, , procedure, and correct site confirmed prior to the start of procedure.

## 2022-10-10 ENCOUNTER — HEALTH MAINTENANCE LETTER (OUTPATIENT)
Age: 52
End: 2022-10-10

## 2023-01-20 ENCOUNTER — OFFICE VISIT (OUTPATIENT)
Dept: FAMILY MEDICINE | Facility: CLINIC | Age: 53
End: 2023-01-20
Payer: COMMERCIAL

## 2023-01-20 VITALS
OXYGEN SATURATION: 99 % | WEIGHT: 240 LBS | BODY MASS INDEX: 33.6 KG/M2 | SYSTOLIC BLOOD PRESSURE: 130 MMHG | DIASTOLIC BLOOD PRESSURE: 78 MMHG | HEART RATE: 64 BPM | TEMPERATURE: 98.1 F | RESPIRATION RATE: 18 BRPM | HEIGHT: 71 IN

## 2023-01-20 DIAGNOSIS — Z12.11 SCREEN FOR COLON CANCER: ICD-10-CM

## 2023-01-20 DIAGNOSIS — R17 ELEVATED BILIRUBIN: ICD-10-CM

## 2023-01-20 DIAGNOSIS — Z00.00 ROUTINE GENERAL MEDICAL EXAMINATION AT A HEALTH CARE FACILITY: Primary | ICD-10-CM

## 2023-01-20 DIAGNOSIS — Z80.42 FAMILY HISTORY OF PROSTATE CANCER: ICD-10-CM

## 2023-01-20 LAB
ALBUMIN SERPL BCG-MCNC: 4.5 G/DL (ref 3.5–5.2)
ALP SERPL-CCNC: 84 U/L (ref 40–129)
ALT SERPL W P-5'-P-CCNC: 23 U/L (ref 10–50)
AST SERPL W P-5'-P-CCNC: 16 U/L (ref 10–50)
BILIRUB DIRECT SERPL-MCNC: 0.26 MG/DL (ref 0–0.3)
BILIRUB SERPL-MCNC: 1 MG/DL
PROT SERPL-MCNC: 7 G/DL (ref 6.4–8.3)
PSA SERPL-MCNC: 0.61 NG/ML (ref 0–3.5)

## 2023-01-20 PROCEDURE — 36415 COLL VENOUS BLD VENIPUNCTURE: CPT | Performed by: FAMILY MEDICINE

## 2023-01-20 PROCEDURE — G0103 PSA SCREENING: HCPCS | Performed by: FAMILY MEDICINE

## 2023-01-20 PROCEDURE — 80076 HEPATIC FUNCTION PANEL: CPT | Performed by: FAMILY MEDICINE

## 2023-01-20 PROCEDURE — 99396 PREV VISIT EST AGE 40-64: CPT | Performed by: FAMILY MEDICINE

## 2023-01-20 ASSESSMENT — ENCOUNTER SYMPTOMS
ABDOMINAL PAIN: 0
CHILLS: 0
HEMATOCHEZIA: 0
EYE PAIN: 0
HEMATURIA: 0
DIZZINESS: 0
SORE THROAT: 0
NERVOUS/ANXIOUS: 0
JOINT SWELLING: 0
DIARRHEA: 0
WEAKNESS: 0
FEVER: 0
CONSTIPATION: 0
ARTHRALGIAS: 0
HEADACHES: 0
NAUSEA: 0
MYALGIAS: 0
PALPITATIONS: 0
HEARTBURN: 0
FREQUENCY: 0
SHORTNESS OF BREATH: 0
PARESTHESIAS: 0
DYSURIA: 0
COUGH: 0

## 2023-01-20 NOTE — PROGRESS NOTES
SUBJECTIVE:   CC: Candido is an 52 year old who presents for preventative health visit.     Patient has been advised of split billing requirements and indicates understanding: Yes  Healthy Habits:     Getting at least 3 servings of Calcium per day:  Yes    Bi-annual eye exam:  Yes    Dental care twice a year:  Yes    Sleep apnea or symptoms of sleep apnea:  Sleep apnea    Diet:  Regular (no restrictions)    Frequency of exercise:  2-3 days/week    Duration of exercise:  45-60 minutes    Taking medications regularly:  Yes    Medication side effects:  None    PHQ-2 Total Score: 0    Additional concerns today:  No          Today's PHQ-2 Score:   PHQ-2 ( 1999 [employer]) 1/20/2023   Q1: Little interest or pleasure in doing things 0   Q2: Feeling down, depressed or hopeless 0   PHQ-2 Score 0   PHQ-2 Total Score (12-17 Years)- Positive if 3 or more points; Administer PHQ-A if positive -   Q1: Little interest or pleasure in doing things Not at all   Q2: Feeling down, depressed or hopeless Not at all   PHQ-2 Score 0       Have you ever done Advance Care Planning? (For example, a Health Directive, POLST, or a discussion with a medical provider or your loved ones about your wishes): No, advance care planning information given to patient to review.  Patient declined advance care planning discussion at this time.    Social History     Tobacco Use     Smoking status: Former     Smokeless tobacco: Former     Quit date: 7/10/2008     Tobacco comments:     2-3 years ago, 2006   Substance Use Topics     Alcohol use: Yes     Comment: 2-3 drinks daily (drinks beer)         Alcohol Use 1/20/2023   Prescreen: >3 drinks/day or >7 drinks/week? Yes   Prescreen: >3 drinks/day or >7 drinks/week? -   AUDIT SCORE  6       Last PSA:   PSA   Date Value Ref Range Status   07/20/2020 0.64 0 - 4 ug/L Final     Comment:     Assay Method:  Chemiluminescence using Siemens Vista analyzer     Prostate Specific Antigen Screen   Date Value Ref Range Status  "  01/20/2023 0.61 0.00 - 3.50 ng/mL Final       Reviewed orders with patient. Reviewed health maintenance and updated orders accordingly - Yes    Reviewed and updated as needed this visit by clinical staff   Tobacco  Allergies  Meds     [facility] Hx          Reviewed and updated as needed this visit by Provider     Meds     [facility] Hx             Review of Systems   Constitutional: Negative for chills and fever.   HENT: Negative for congestion, ear pain, hearing loss and sore throat.    Eyes: Negative for pain and visual disturbance.   Respiratory: Negative for cough and shortness of breath.    Cardiovascular: Negative for chest pain, palpitations and peripheral edema.   Gastrointestinal: Negative for abdominal pain, constipation, diarrhea, heartburn, hematochezia and nausea.   Genitourinary: Negative for dysuria, frequency, genital sores, hematuria and urgency.   Musculoskeletal: Negative for arthralgias, joint swelling and myalgias.   Skin: Negative for rash.   Neurological: Negative for dizziness, weakness, headaches and paresthesias.   Psychiatric/Behavioral: Negative for mood changes. The patient is not nervous/anxious.      At time of exam, patient has no acute physical or mental health concerns.    Concerning labs, we discussed rechecking his previously elevate bilirubin level.     OBJECTIVE:   /78   Pulse 64   Temp 98.1  F (36.7  C) (Oral)   Resp 18   Ht 1.791 m (5' 10.5\")   Wt 108.9 kg (240 lb)   SpO2 99%   BMI 33.95 kg/m      Physical Exam  General: Vital signs reviewed.  Patient is in no acute appearing distress.  Breathing appears nonlabored.  Patient is alert and oriented ×3.      ENT: Ear exam shows bilateral tympanic membranes to be clear without injection, nasal turbinates show no injection or edema, no pharyngeal injection or exudate.    Neck: supple with no adenoapthy, palpable abnormal masses, or thyroid abnormality.    Eyes: No scleral, lid, or periorbital injection or edema noted.  No " "eye mattering noted.  Corneas are clear. Pupils are equal round and reactive to light with normal consensual eye movement.    Heart: Heart rate is regular without murmur.    Lungs: Lungs are clear to auscultation with good airflow bilaterally.    Abdomen:  Abdomen is soft, nontender.  No palpable abnormal masses or organomegaly.  Bowel sounds are normal.    Genital exam: Patient declined exam for possible hernia.    Back: No areas of tenderness.    Skin: Warm and dry, with no rash or abnormal lesions noted.    Extremities: No lower leg edema noted.  No joint edema or restricted range of motion noted.    Neuro: No acute focal deficits or other abnormalities noted.    Psych: Patient is very pleasant, making good eye contact, with clear and fluent speech.  Answers questions appropriately. No psychomotor agitation.         ASSESSMENT/PLAN:   Candido was seen today for physical.    Diagnoses and all orders for this visit:  See after visit summary and result note from studies for helpful information and advice given to patient.    Routine general medical examination at a health care facility    Screen for colon cancer  -     Fecal colorectal cancer screen (FIT); Future  -     Fecal colorectal cancer screen (FIT)    Elevated bilirubin  -     Hepatic function panel; Future  -     Hepatic function panel    Family history of prostate cancer  -     Prostate Specific Antigen Screen    Other orders  -     REVIEW OF HEALTH MAINTENANCE PROTOCOL ORDERS              COUNSELING:   Reviewed preventive health counseling, as reflected in patient instructions      BMI:   Estimated body mass index is 33.95 kg/m  as calculated from the following:    Height as of this encounter: 1.791 m (5' 10.5\").    Weight as of this encounter: 108.9 kg (240 lb).         He reports that he has quit smoking. He quit smokeless tobacco use about 14 years ago.            Simone Bansal DO  [facility]  "

## 2023-01-20 NOTE — PATIENT INSTRUCTIONS
I will review your studies via [employer] when they are available. If you have any questions or concerns please let me know via [employer], or call the clinic.    Preventive Health Recommendations  Male Ages 50 - 64    Yearly exam:             See your health care provider every year in order to  o   Review health changes.   o   Discuss preventive care.    o   Review your medicines if your doctor has prescribed any.   Have a cholesterol test every 5 years, or more frequently if you are at risk for high cholesterol/heart disease.   Have a diabetes test (fasting glucose) every three years. If you are at risk for diabetes, you should have this test more often.   Have a colonoscopy at age 50, or have a yearly FIT test (stool test). These exams will check for colon cancer.    Talk with your health care provider about whether or not a prostate cancer screening test (PSA) is right for you.  You should be tested each year for STDs (sexually transmitted diseases), if you re at risk.     Shots: Get a flu shot each year. Get a tetanus shot every 10 years.     Nutrition:  Eat at least 5 servings of fruits and vegetables daily.   Eat whole-grain bread, whole-wheat pasta and brown rice instead of white grains and rice.   Get adequate Calcium and Vitamin D.     Lifestyle  Exercise for at least 150 minutes a week (30 minutes a day, 5 days a week). This will help you control your weight and prevent disease.   Limit alcohol to one drink per day.   No smoking.   Wear sunscreen to prevent skin cancer.   See your dentist every six months for an exam and cleaning.   See your eye doctor every 1 to 2 years.

## 2023-01-23 LAB — HEMOCCULT STL QL IA: POSITIVE

## 2023-01-23 PROCEDURE — 82274 ASSAY TEST FOR BLOOD FECAL: CPT | Performed by: FAMILY MEDICINE

## 2023-01-27 ENCOUNTER — MYC MEDICAL ADVICE (OUTPATIENT)
Dept: FAMILY MEDICINE | Facility: CLINIC | Age: 53
End: 2023-01-27
Payer: COMMERCIAL

## 2023-01-27 DIAGNOSIS — R19.5 POSITIVE FIT (FECAL IMMUNOCHEMICAL TEST): Primary | ICD-10-CM

## 2023-02-01 ENCOUNTER — TELEPHONE (OUTPATIENT)
Dept: GASTROENTEROLOGY | Facility: CLINIC | Age: 53
End: 2023-02-01
Payer: COMMERCIAL

## 2023-02-01 RX ORDER — BISACODYL 5 MG
TABLET, DELAYED RELEASE (ENTERIC COATED) ORAL
Qty: 4 TABLET | Refills: 0 | Status: SHIPPED | OUTPATIENT
Start: 2023-02-01 | End: 2024-02-27

## 2023-02-01 NOTE — TELEPHONE ENCOUNTER
"    Screening Questions  BLUE  KIND OF PREP RED  LOCATION [review exclusion criteria] GREEN  SEDATION TYPE        Y Are you active on mychart?       Simone Bansal, DO Ordering/Referring Provider?        [facility] What type of coverage do you have?      N Have you had a positive covid test in the last 14 days?     33.5 1. BMI  [BMI 40+ - review exclusion criteria]    Y  2. Are you able to give consent for your medical care? [IF NO,RN REVIEW]          N  3. Are you taking any prescription pain medications on a routine schedule   (ex narcotics: tramadol, oxycodone, roxicodone, oxycontin,  and percocet)?          3a. EXTENDED PREP What kind of prescription?     N 4. Do you have any chemical dependencies such as alcohol, street drugs, or methadone?        **If yes 3- 5 , please schedule with MAC sedation.**          IF YES TO ANY 6 - 10 - HOSPITAL SETTING ONLY.     N 6.   Do you need assistance transferring?     N 7.   Have you had a heart or lung transplant?    N 8.   Are you currently on dialysis?   N 9.   Do you use daily home oxygen?   N 10. Do you take nitroglycerin?   10a.  If yes, how often?     11. [FEMALES]   Are you currently pregnant?    11a.  If yes, how many weeks? [ Greater than 12 weeks, OR NEEDED]    N 12. Do you have Pulmonary Hypertension? *NEED PAC APPT AT [facility]*     N 13. [review exclusion criteria]  Do you have any implantable devices in your body (pacemaker, defib, LVAD)?    N 14. In the past 6 months, have you had any heart related issues including cardiomyopathy or heart attack?     14a.  If yes, did it require cardiac stenting if so when?     N 15. Have you had a stroke or Transient ischemic attack (TIA - aka  mini stroke ) within 6 months?      Y 16. Do you have mod to severe Obstructive Sleep Apnea?  [Hospital only]    N 17. Do you have SEVERE AND UNCONTROLLED asthma? *NEED PAC APPT AT [facility]*     N 18. Are you currently taking any blood thinners?     18a. If yes, inform patient to \"follow " "up w/ ordering provider for bridging instructions.\"    N 19. Do you take the medication Phentermine?    19a. If yes, \"Hold for 7 days before procedure.  Please consult your prescribing provider if you have questions about holding this medication.\"     N  20. Do you have chronic kidney disease?      N  21. Do you have a diagnosis of diabetes?     N  22. On a regular basis do you go 3-5 days between bowel movements?      23. Preferred LOCAL Pharmacy for Pre Prescription    [ LIST ONLY ONE PHARMACY]     [id number] - [place], [place] - [address] [address]        - CLOSING REMINDERS -    Informed patient they will need an adult    Cannot take any type of public or medical transportation alone    Conscious Sedation- Needs  for 6 hours after the procedure       MAC/General-Needs  for 24 hours after procedure    Pre-Procedure Covid test to be completed [[facility] PCR Testing Required]    Confirmed Nurse will call to complete assessment       - SCHEDULING DETAILS -  yes Hospital Setting Required? If yes, what is the exclusion?: SHIMA   Shayna  Surgeon    02/08/23  Date of Procedure  Lower Endoscopy [Colonoscopy]  Type of Procedure Scheduled  [facility]   STANDARD [facility]-If you answer yes to questions #8, #20, #21Which Colonoscopy Prep was Sent?     Moderate Sedation Type     N PAC / Pre-op Required                 "

## 2023-02-08 ENCOUNTER — HOSPITAL ENCOUNTER (OUTPATIENT)
Facility: CLINIC | Age: 53
Discharge: HOME | End: 2023-02-08
Attending: COLON & RECTAL SURGERY | Admitting: COLON & RECTAL SURGERY
Payer: COMMERCIAL

## 2023-02-08 VITALS
DIASTOLIC BLOOD PRESSURE: 80 MMHG | BODY MASS INDEX: 33.6 KG/M2 | HEART RATE: 58 BPM | RESPIRATION RATE: 12 BRPM | HEIGHT: 71 IN | OXYGEN SATURATION: 98 % | WEIGHT: 240 LBS | SYSTOLIC BLOOD PRESSURE: 126 MMHG

## 2023-02-08 DIAGNOSIS — Z12.11 SPECIAL SCREENING FOR MALIGNANT NEOPLASMS, COLON: Primary | ICD-10-CM

## 2023-02-08 LAB — COLONOSCOPY: NORMAL

## 2023-02-08 PROCEDURE — 88305 TISSUE EXAM BY PATHOLOGIST: CPT | Mod: 26 | Performed by: PATHOLOGY

## 2023-02-08 PROCEDURE — G0500 MOD SEDAT ENDO SERVICE >5YRS: HCPCS | Performed by: COLON & RECTAL SURGERY

## 2023-02-08 PROCEDURE — 45385 COLONOSCOPY W/LESION REMOVAL: CPT | Mod: PT | Performed by: COLON & RECTAL SURGERY

## 2023-02-08 PROCEDURE — 88305 TISSUE EXAM BY PATHOLOGIST: CPT | Mod: TC | Performed by: COLON & RECTAL SURGERY

## 2023-02-08 PROCEDURE — G0121 COLON CA SCRN NOT HI RSK IND: HCPCS | Performed by: COLON & RECTAL SURGERY

## 2023-02-08 PROCEDURE — 250N000011 HC RX IP 250 OP 636: Performed by: COLON & RECTAL SURGERY

## 2023-02-08 PROCEDURE — 45378 DIAGNOSTIC COLONOSCOPY: CPT | Performed by: COLON & RECTAL SURGERY

## 2023-02-08 RX ORDER — FENTANYL CITRATE 50 UG/ML
INJECTION, SOLUTION INTRAMUSCULAR; INTRAVENOUS PRN
Status: DISCONTINUED | OUTPATIENT
Start: 2023-02-08 | End: 2023-02-08 | Stop reason: HOSPADM

## 2023-02-08 RX ORDER — LIDOCAINE 40 MG/G
CREAM TOPICAL
Status: DISCONTINUED | OUTPATIENT
Start: 2023-02-08 | End: 2023-02-08 | Stop reason: HOSPADM

## 2023-02-08 RX ORDER — ONDANSETRON 2 MG/ML
4 INJECTION INTRAMUSCULAR; INTRAVENOUS
Status: DISCONTINUED | OUTPATIENT
Start: 2023-02-08 | End: 2023-02-08 | Stop reason: HOSPADM

## 2023-02-08 ASSESSMENT — ACTIVITIES OF DAILY LIVING (ADL): ADLS_ACUITY_SCORE: 33

## 2023-02-08 NOTE — H&P
Colon & Rectal Surgery History and Physical  Pre-Endoscopy Procedure Note    History of Present Illness   I have been asked by  to evaluate this 52 year old male for colorectal caner screening. He currently denies any abdominal pain, weight loss, bleeding per rectum, or recent change in bowel habits.    Past Medical History  Diagnosis Date     Sleep apnea        Past Surgical History  Procedure Laterality Date     ESOPHAGOSCOPY, GASTROSCOPY, DUODENOSCOPY (EGD), COMBINED N/A 07/10/2018    Procedure: COMBINED ESOPHAGOSCOPY, GASTROSCOPY, DUODENOSCOPY (EGD);  COMBINED ESOPHAGOSCOPY, GASTROSCOPY, DUODENOSCOPY (EGD) [[id number]]  unknown time that ENT doctor can come to assist today;  Surgeon: Kelvin Zarate MD;  Location:  GI     [place] teeth extraction          Medications  Medication Sig     Multiple Vitamin (MULTI-VITAMIN DAILY PO) Take  by mouth daily.     Omega-3 Fatty Acids (OMEGA-3 FISH OIL PO) Take by mouth daily       Allergies  Allergen Reactions     Cats      Seasonal Allergies         Family History   family history includes Breast Cancer in his mother; Diabetes in his father; Hypertension in his father; Prostate Cancer in his father.     Social History   He reports that he has quit smoking. He quit smokeless tobacco use about 14 years ago. He reports current alcohol use. He reports that he does not use drugs.    Review of Systems   Constitutional:  No fever, weight change or fatigue.    Eyes:     No dry eyes or vision changes.   Ears/Nose/Throat/Neck:  No oral ulcers, sore throat or voice change.    Cardiovascular:   No palpitations, syncope, angina or edema.   Respiratory:    No chest pain, excessive sleepiness, shortness of breath or hemoptysis.    Gastrointestinal:   No abdominal pain, nausea, vomiting, diarrhea or heartburn.    Genitourinary:   No dysuria, hematuria, urinary retention or urinary frequency.   Musculoskeletal:  No joint swelling or arthralgias.    Dermatologic:  No skin rash or  "other skin changes.   Neurologic:    No focal weakness or numbness. No neuropathy.   Psychiatric:    No depression, anxiety, suicidal ideation, or paranoid ideation.   Endocrine:   No cold or heat intolerance, polydipsia, hirsutism, change in libido, or flushing.   Hematology/Lymphatic:  No bleeding or lymphadenopathy.    Allergy/Immunology:  No rhinitis or hives.     Physical Exam   Vitals:  /89, HR 77, RR 16, height 1.791 m (5' 10.5\"), weight 108.9 kg (240 lb), SpO2 99 %.    General:  Alert and oriented to person, place and time   Airway: Normal oropharyngeal airway and neck mobility   Lungs:  Clear bilaterally   Heart:  Regular rate and rhythm   Abdomen: Soft, NT, ND, no masses   Extremities: Warm, good capillary refill    ASA Grade: II (mild systemic disease)    Impression: Cleared for use of conscious sedation for colorectal cancer screening    Plan: Proceed with colonoscopy     Anjelica Corral MD  [facility]  [phone]  "

## 2023-02-09 LAB
PATH REPORT.COMMENTS IMP SPEC: NORMAL
PATH REPORT.COMMENTS IMP SPEC: NORMAL
PATH REPORT.FINAL DX SPEC: NORMAL
PATH REPORT.GROSS SPEC: NORMAL
PATH REPORT.MICROSCOPIC SPEC OTHER STN: NORMAL
PATH REPORT.RELEVANT HX SPEC: NORMAL
PHOTO IMAGE: NORMAL

## 2023-03-18 ENCOUNTER — E-VISIT (OUTPATIENT)
Dept: FAMILY MEDICINE | Facility: CLINIC | Age: 53
End: 2023-03-18
Payer: COMMERCIAL

## 2023-03-18 DIAGNOSIS — N52.9 ERECTILE DYSFUNCTION, UNSPECIFIED ERECTILE DYSFUNCTION TYPE: Primary | ICD-10-CM

## 2023-03-18 PROCEDURE — 99421 OL DIG E/M SVC 5-10 MIN: CPT | Performed by: FAMILY MEDICINE

## 2023-03-20 RX ORDER — TADALAFIL 10 MG/1
TABLET ORAL
Qty: 12 TABLET | Refills: 3 | Status: SHIPPED | OUTPATIENT
Start: 2023-03-20 | End: 2023-03-21

## 2023-03-20 NOTE — PATIENT INSTRUCTIONS
Thank you for choosing us for your care. I have placed an order for a prescription so that you can start treatment. View your full visit summary for details by clicking on the link below. Your pharmacist will able to address any questions you may have about the medication.     If you're not feeling better within 5-7 days, please schedule an appointment.  You can schedule an appointment right here in [facility], or call [phone]  If the visit is for the same symptoms as your eVisit, we'll refund the cost of your eVisit if seen within seven days.

## 2023-03-21 RX ORDER — TADALAFIL 10 MG/1
TABLET ORAL
Qty: 12 TABLET | Refills: 3 | Status: SHIPPED | OUTPATIENT
Start: 2023-03-21

## 2024-02-27 ENCOUNTER — OFFICE VISIT (OUTPATIENT)
Dept: FAMILY MEDICINE | Facility: CLINIC | Age: 54
End: 2024-02-27
Payer: COMMERCIAL

## 2024-02-27 VITALS
TEMPERATURE: 98.1 F | WEIGHT: 247 LBS | HEART RATE: 81 BPM | OXYGEN SATURATION: 96 % | RESPIRATION RATE: 20 BRPM | BODY MASS INDEX: 35.36 KG/M2 | HEIGHT: 70 IN | SYSTOLIC BLOOD PRESSURE: 132 MMHG | DIASTOLIC BLOOD PRESSURE: 88 MMHG

## 2024-02-27 DIAGNOSIS — Z00.00 ROUTINE GENERAL MEDICAL EXAMINATION AT A HEALTH CARE FACILITY: Primary | ICD-10-CM

## 2024-02-27 DIAGNOSIS — H61.22 CERUMEN DEBRIS ON TYMPANIC MEMBRANE OF LEFT EAR: ICD-10-CM

## 2024-02-27 LAB
ALBUMIN SERPL BCG-MCNC: 4.5 G/DL (ref 3.5–5.2)
ALP SERPL-CCNC: 82 U/L (ref 40–150)
ALT SERPL W P-5'-P-CCNC: 19 U/L (ref 0–70)
ANION GAP SERPL CALCULATED.3IONS-SCNC: 11 MMOL/L (ref 7–15)
AST SERPL W P-5'-P-CCNC: 18 U/L (ref 0–45)
BILIRUB SERPL-MCNC: 1.2 MG/DL
BUN SERPL-MCNC: 14 MG/DL (ref 6–20)
CALCIUM SERPL-MCNC: 9.3 MG/DL (ref 8.6–10)
CHLORIDE SERPL-SCNC: 105 MMOL/L (ref 98–107)
CHOLEST SERPL-MCNC: 166 MG/DL
CREAT SERPL-MCNC: 0.93 MG/DL (ref 0.67–1.17)
DEPRECATED HCO3 PLAS-SCNC: 23 MMOL/L (ref 22–29)
EGFRCR SERPLBLD CKD-EPI 2021: >90 ML/MIN/1.73M2
ERYTHROCYTE [DISTWIDTH] IN BLOOD BY AUTOMATED COUNT: 12.2 % (ref 10–15)
FASTING STATUS PATIENT QL REPORTED: YES
GLUCOSE SERPL-MCNC: 96 MG/DL (ref 70–99)
HCT VFR BLD AUTO: 44.7 % (ref 40–53)
HDLC SERPL-MCNC: 74 MG/DL
HGB BLD-MCNC: 14.6 G/DL (ref 13.3–17.7)
LDLC SERPL CALC-MCNC: 83 MG/DL
MCH RBC QN AUTO: 30.3 PG (ref 26.5–33)
MCHC RBC AUTO-ENTMCNC: 32.7 G/DL (ref 31.5–36.5)
MCV RBC AUTO: 93 FL (ref 78–100)
NONHDLC SERPL-MCNC: 92 MG/DL
PLATELET # BLD AUTO: 249 10E3/UL (ref 150–450)
POTASSIUM SERPL-SCNC: 4.5 MMOL/L (ref 3.4–5.3)
PROT SERPL-MCNC: 7.3 G/DL (ref 6.4–8.3)
PSA SERPL DL<=0.01 NG/ML-MCNC: 0.82 NG/ML (ref 0–3.5)
RBC # BLD AUTO: 4.82 10E6/UL (ref 4.4–5.9)
SHBG SERPL-SCNC: 31 NMOL/L (ref 11–80)
SODIUM SERPL-SCNC: 139 MMOL/L (ref 135–145)
TRIGL SERPL-MCNC: 44 MG/DL
WBC # BLD AUTO: 10.3 10E3/UL (ref 4–11)

## 2024-02-27 PROCEDURE — 36415 COLL VENOUS BLD VENIPUNCTURE: CPT | Performed by: FAMILY MEDICINE

## 2024-02-27 PROCEDURE — 80061 LIPID PANEL: CPT | Performed by: FAMILY MEDICINE

## 2024-02-27 PROCEDURE — 84403 ASSAY OF TOTAL TESTOSTERONE: CPT | Performed by: FAMILY MEDICINE

## 2024-02-27 PROCEDURE — G0103 PSA SCREENING: HCPCS | Performed by: FAMILY MEDICINE

## 2024-02-27 PROCEDURE — 80053 COMPREHEN METABOLIC PANEL: CPT | Performed by: FAMILY MEDICINE

## 2024-02-27 PROCEDURE — 69209 REMOVE IMPACTED EAR WAX UNI: CPT | Mod: LT | Performed by: FAMILY MEDICINE

## 2024-02-27 PROCEDURE — 84270 ASSAY OF SEX HORMONE GLOBUL: CPT | Performed by: FAMILY MEDICINE

## 2024-02-27 PROCEDURE — 85027 COMPLETE CBC AUTOMATED: CPT | Performed by: FAMILY MEDICINE

## 2024-02-27 PROCEDURE — 99396 PREV VISIT EST AGE 40-64: CPT | Mod: 25 | Performed by: FAMILY MEDICINE

## 2024-02-27 SDOH — HEALTH STABILITY: PHYSICAL HEALTH: ON AVERAGE, HOW MANY DAYS PER WEEK DO YOU ENGAGE IN MODERATE TO STRENUOUS EXERCISE (LIKE A BRISK WALK)?: 3 DAYS

## 2024-02-27 ASSESSMENT — SOCIAL DETERMINANTS OF HEALTH (SDOH)
DO YOU BELONG TO ANY CLUBS OR ORGANIZATIONS SUCH AS CHURCH GROUPS UNIONS, FRATERNAL OR ATHLETIC GROUPS, OR SCHOOL GROUPS?: YES
HOW OFTEN DO YOU GET TOGETHER WITH FRIENDS OR RELATIVES?: ONCE A WEEK
IN A TYPICAL WEEK, HOW MANY TIMES DO YOU TALK ON THE PHONE WITH FAMILY, FRIENDS, OR NEIGHBORS?: ONCE A WEEK
HOW OFTEN DO YOU ATTENT MEETINGS OF THE CLUB OR ORGANIZATION YOU BELONG TO?: MORE THAN 4 TIMES PER YEAR

## 2024-02-27 ASSESSMENT — LIFESTYLE VARIABLES: HOW OFTEN DO YOU HAVE A DRINK CONTAINING ALCOHOL: 2-3 TIMES A WEEK

## 2024-02-27 NOTE — PATIENT INSTRUCTIONS
Preventive Care Advice   This is general advice given by our system to help you stay healthy. However, your care team may have specific advice just for you. Please talk to your care team about your preventive care needs.  Nutrition  Eat 5 or more servings of fruits and vegetables each day.  Try wheat bread, brown rice and whole grain pasta (instead of white bread, rice, and pasta).  Get enough calcium and vitamin D. Check the label on foods and aim for 100% of the RDA (recommended daily allowance).  Lifestyle  Exercise at least 150 minutes each week   (30 minutes a day, 5 days a week).  Do muscle strengthening activities 2 days a week. These help control your weight and prevent disease.  No smoking.  Wear sunscreen to prevent skin cancer.  Have a dental exam and cleaning every 6 months.  Yearly exams  See your health care team every year to talk about:  Any changes in your health.  Any medicines your care team has prescribed.  Preventive care, family planning, and ways to prevent chronic diseases.  Shots (vaccines)   HPV shots (up to age 26), if you've never had them before.  Hepatitis B shots (up to age 59), if you've never had them before.  COVID-19 shot: Get this shot when it's due.  Flu shot: Get a flu shot every year.  Tetanus shot: Get a tetanus shot every 10 years.  Pneumococcal, hepatitis A, and RSV shots: Ask your care team if you need these based on your risk.  Shingles shot (for age 50 and up).  General health tests  Diabetes screening:  Starting at age 35, Get screened for diabetes at least every 3 years.  If you are younger than age 35, ask your care team if you should be screened for diabetes.  Cholesterol test: At age 39, start having a cholesterol test every 5 years, or more often if advised.  Bone density scan (DEXA): At age 50, ask your care team if you should have this scan for osteoporosis (brittle bones).  Hepatitis C: Get tested at least once in your life.  STIs (sexually transmitted  infections)  Before age 24: Ask your care team if you should be screened for STIs.  After age 24: Get screened for STIs if you're at risk. You are at risk for STIs (including HIV) if:  You are sexually active with more than one person.  You don't use condoms every time.  You or a partner was diagnosed with a sexually transmitted infection.  If you are at risk for HIV, ask about PrEP medicine to prevent HIV.  Get tested for HIV at least once in your life, whether you are at risk for HIV or not.  Cancer screening tests  Cervical cancer screening: If you have a cervix, begin getting regular cervical cancer screening tests at age 21. Most people who have regular screenings with normal results can stop after age 65. Talk about this with your provider.  Breast cancer scan (mammogram): If you've ever had breasts, begin having regular mammograms starting at age 40. This is a scan to check for breast cancer.  Colon cancer screening: It is important to start screening for colon cancer at age 45.  Have a colonoscopy test every 10 years (or more often if you're at risk) Or, ask your provider about stool tests like a FIT test every year or Cologuard test every 3 years.  To learn more about your testing options, visit: https://[email or web address]/426599.pdf.  For help making a decision, visit: https://bit.ly/cr67816.  Prostate cancer screening test: If you have a prostate and are age 55 to 69, ask your provider if you would benefit from a yearly prostate cancer screening test.  Lung cancer screening: If you are a current or former smoker age 50 to 80, ask your care team if ongoing lung cancer screenings are right for you.  For informational purposes only. Not to replace the advice of your health care provider. Copyright   2023 [employer]. All rights reserved. Clinically reviewed by the [facility] Transitions Program. [employer] 737072 - REV 01/24.

## 2024-02-27 NOTE — PROGRESS NOTES
"Preventive Care Visit  [facility]  Yannick Terrazas MD, Family Medicine  Feb 27, 2024    Assessment & Plan     Routine general medical examination at a health care facility  - CBC with platelets  - Comprehensive metabolic panel (BMP + Alb, Alk Phos, ALT, AST, Total. Bili, TP)  - Lipid panel reflex to direct LDL Fasting  - PSA, screen  - Testosterone Free and Total  - CBC with platelets  - Comprehensive metabolic panel (BMP + Alb, Alk Phos, ALT, AST, Total. Bili, TP)  - Lipid panel reflex to direct LDL Fasting  - PSA, screen  - Testosterone Free and Total    Cerumen debris on tympanic membrane of left ear  Resolved after irrigation by medical assistant, no evidence of infection or TM perforation.  - UT REMOVAL IMPACTED CERUMEN IRRIGATION/LVG UNILAT          BMI  Estimated body mass index is 35.44 kg/m  as calculated from the following:    Height as of this encounter: 1.778 m (5' 10\").    Weight as of this encounter: 112 kg (247 lb).   Weight management plan: Discussed healthy diet and exercise guidelines    Counseling  Appropriate preventive services were discussed with this patient, including applicable screening as appropriate for fall prevention, nutrition, physical activity, Tobacco-use cessation, weight loss and cognition.  Checklist reviewing preventive services available has been given to the patient.  Reviewed patient's diet, addressing concerns and/or questions.   He is at risk for lack of exercise and has been provided with information to increase physical activity for the benefit of his well-being.   The patient was instructed to see the dentist every 6 months.         Swapnil Rey is a 53 year old, presenting for the following:  Physical (PE---fasting)        2/27/2024    10:20 AM   Additional Questions   Roomed by Millie Price        Health Care Directive  Patient does not have a Health Care Directive or Living Will: Discussed advance care planning with patient; however, " patient declined at this time.    HPI    Feels there is some cotton or an object stuck in his left ear.  No pain        2/27/2024   General Health   How would you rate your overall physical health? Excellent   Feel stress (tense, anxious, or unable to sleep) Not at all    Not at all         2/27/2024   Nutrition   Three or more servings of calcium each day? (!) NO   Diet: Regular (no restrictions)   How many servings of fruit and vegetables per day? (!) 2-3   How many sweetened beverages each day? 0-1         2/27/2024   Exercise   Days per week of moderate/strenous exercise 3 days    3 days         2/27/2024   Social Factors   Frequency of gathering with friends or relatives Once a week   Worry food won't last until get money to buy more No    No   Food not last or not have enough money for food? No    No   Do you have housing?  Yes    Yes   Are you worried about losing your housing? No    No   Lack of transportation? No    No   Unable to get utilities (heat,electricity)? No    No         2/27/2024   Fall Risk   Fallen 2 or more times in the past year? No   Trouble with walking or balance? No          2/27/2024   Dental   Dentist two times every year? (!) NO         2/27/2024   TB Screening   Were you born outside of [place]?  No         Today's PHQ-2 Score:       2/27/2024    10:16 AM   PHQ-2 ( 1999 [employer])   Q1: Little interest or pleasure in doing things 0   Q2: Feeling down, depressed or hopeless 0   PHQ-2 Score 0   Q1: Little interest or pleasure in doing things Not at all   Q2: Feeling down, depressed or hopeless Not at all   PHQ-2 Score 0           2/27/2024   Substance Use   Frequency of drinking alcohol? 2-3 times a week   Alcohol more than 3/day or more than 7/wk No   Do you use any other substances recreationally? No     Social History     Tobacco Use    Smoking status: Former     Passive exposure: Past    Smokeless tobacco: Former     Quit date: 7/10/2008    Tobacco comments:     2006   Vaping Use    Vaping  "Use: Never used   Substance Use Topics    Alcohol use: Yes     Comment: 2-3 drinks  (drinks beer)    Drug use: No           2/27/2024   STI Screening   New sexual partner(s) since last STI/HIV test? No   ASCVD Risk   The 10-year ASCVD risk score (Chintan NAVARRETE, et al., 2019) is: 2.1%    Values used to calculate the score:      Age: 53 years      Sex: Male      Is Non- : No      Diabetic: No      Tobacco smoker: No      Systolic Blood Pressure: 132 mmHg      Is BP treated: No      HDL Cholesterol: 76 mg/dL      Total Cholesterol: 169 mg/dL           Reviewed and updated as needed this visit by Provider                             Objective    Exam  /88   Pulse 81   Temp 98.1  F (36.7  C) (Oral)   Resp 20   Ht 1.778 m (5' 10\")   Wt 112 kg (247 lb)   SpO2 96%   BMI 35.44 kg/m     Estimated body mass index is 35.44 kg/m  as calculated from the following:    Height as of this encounter: 1.778 m (5' 10\").    Weight as of this encounter: 112 kg (247 lb).    Physical Exam  Vitals reviewed.   Constitutional:       General: He is not in acute distress.     Appearance: Normal appearance. He is not ill-appearing.   HENT:      Head: Normocephalic and atraumatic.      Right Ear: External ear normal.      Left Ear: External ear normal.      Ears:      Comments: Ceruminous debris over the left tympanic membrane     Nose: Nose normal.      Mouth/Throat:      Mouth: Mucous membranes are moist.      Pharynx: Oropharynx is clear.   Eyes:      General: No scleral icterus.        Right eye: No discharge.         Left eye: No discharge.      Extraocular Movements: Extraocular movements intact.      Pupils: Pupils are equal, round, and reactive to light.   Neck:      Vascular: No JVD.   Cardiovascular:      Rate and Rhythm: Normal rate and regular rhythm.   Pulmonary:      Effort: Pulmonary effort is normal. No respiratory distress.      Breath sounds: Normal breath sounds.   Abdominal:      " General: Abdomen is flat. Bowel sounds are normal.      Palpations: Abdomen is soft.   Musculoskeletal:         General: No swelling.      Cervical back: Normal range of motion.   Lymphadenopathy:      Cervical: No cervical adenopathy.   Skin:     General: Skin is warm.      Capillary Refill: Capillary refill takes less than 2 seconds.   Neurological:      General: No focal deficit present.      Mental Status: He is alert.   Psychiatric:         Mood and Affect: Mood normal.         Behavior: Behavior normal.             Signed Electronically by: Yannick Terrazas MD

## 2024-02-29 LAB
TESTOST FREE SERPL-MCNC: 5.08 NG/DL
TESTOST SERPL-MCNC: 253 NG/DL (ref 240–950)

## 2024-03-28 ENCOUNTER — OFFICE VISIT (OUTPATIENT)
Dept: FAMILY MEDICINE | Facility: CLINIC | Age: 54
End: 2024-03-28
Payer: COMMERCIAL

## 2024-03-28 VITALS
DIASTOLIC BLOOD PRESSURE: 77 MMHG | TEMPERATURE: 98.9 F | SYSTOLIC BLOOD PRESSURE: 128 MMHG | BODY MASS INDEX: 35.78 KG/M2 | HEART RATE: 75 BPM | RESPIRATION RATE: 18 BRPM | HEIGHT: 70 IN | WEIGHT: 249.9 LBS | OXYGEN SATURATION: 96 %

## 2024-03-28 DIAGNOSIS — R79.89 LOW TESTOSTERONE: Primary | ICD-10-CM

## 2024-03-28 PROCEDURE — 99213 OFFICE O/P EST LOW 20 MIN: CPT | Performed by: FAMILY MEDICINE

## 2024-03-28 NOTE — PROGRESS NOTES
"  Assessment & Plan     Low testosterone  Recommend repeat confirmatory testing for secondary causes.  Reviewed starting testosterone replacement therapy if less than 264 mg/dL, patient prefers AndroGel if possible.  Informed patient I will review this once on back from vacation and will start this medication if meeting medical criteria.  - TSH with free T4 reflex  - Luteinizing Hormone  - Follicle stimulating hormone  - Prolactin  - Cortisol  - Testosterone Free and Total        Subjective   Candido is a 53 year old, presenting for the following health issues:  Abnormal Labs        3/28/2024     4:12 PM   Additional Questions   Roomed by Mara   Patient is here to discuss his Testerone levels  History of Present Illness       Reason for visit:  Rerest    He eats 0-1 servings of fruits and vegetables daily.He consumes 0 sweetened beverage(s) daily.He exercises with enough effort to increase his heart rate 60 or more minutes per day.  He exercises with enough effort to increase his heart rate 3 or less days per week.   He is taking medications regularly.     Patient presents for follow-up of testosterone.  Continues to have fatigue, low sex drive and unintentional weight gain.                Objective    /77   Pulse 75   Temp 98.9  F (37.2  C) (Tympanic)   Resp 18   Ht 1.778 m (5' 10\")   Wt 113.4 kg (249 lb 14.4 oz)   SpO2 96%   BMI 35.86 kg/m    Body mass index is 35.86 kg/m .  Physical Exam  Constitutional:       Appearance: He is not ill-appearing.   Psychiatric:         Mood and Affect: Mood normal.         Behavior: Behavior normal.                    Signed Electronically by: Yannick Terrazas MD    "

## 2024-03-29 ENCOUNTER — LAB (OUTPATIENT)
Dept: LAB | Facility: CLINIC | Age: 54
End: 2024-03-29
Payer: COMMERCIAL

## 2024-03-29 DIAGNOSIS — R79.89 LOW TESTOSTERONE: ICD-10-CM

## 2024-03-29 LAB
CORTIS SERPL-MCNC: 14.4 UG/DL
FSH SERPL IRP2-ACNC: 4.9 MIU/ML (ref 1.5–12.4)
LH SERPL-ACNC: 5.8 MIU/ML (ref 1.7–8.6)
PROLACTIN SERPL 3RD IS-MCNC: 15 NG/ML (ref 4–15)
SHBG SERPL-SCNC: 33 NMOL/L (ref 11–80)
TSH SERPL DL<=0.005 MIU/L-ACNC: 2.35 UIU/ML (ref 0.3–4.2)

## 2024-03-29 PROCEDURE — 83001 ASSAY OF GONADOTROPIN (FSH): CPT

## 2024-03-29 PROCEDURE — 84146 ASSAY OF PROLACTIN: CPT

## 2024-03-29 PROCEDURE — 82533 TOTAL CORTISOL: CPT

## 2024-03-29 PROCEDURE — 84403 ASSAY OF TOTAL TESTOSTERONE: CPT

## 2024-03-29 PROCEDURE — 84270 ASSAY OF SEX HORMONE GLOBUL: CPT

## 2024-03-29 PROCEDURE — 83002 ASSAY OF GONADOTROPIN (LH): CPT

## 2024-03-29 PROCEDURE — 36415 COLL VENOUS BLD VENIPUNCTURE: CPT

## 2024-03-29 PROCEDURE — 84443 ASSAY THYROID STIM HORMONE: CPT

## 2024-04-02 LAB
TESTOST FREE SERPL-MCNC: 7.27 NG/DL
TESTOST SERPL-MCNC: 362 NG/DL (ref 240–950)

## 2025-04-12 ENCOUNTER — HEALTH MAINTENANCE LETTER (OUTPATIENT)
Age: 55
End: 2025-04-12

## (undated) DEVICE — KIT ENDO TURNOVER/PROCEDURE W/CLEAN A SCOPE LINERS 103888

## (undated) RX ORDER — FENTANYL CITRATE 50 UG/ML
INJECTION, SOLUTION INTRAMUSCULAR; INTRAVENOUS
Status: DISPENSED
Start: 2023-02-08

## (undated) RX ORDER — FENTANYL CITRATE 50 UG/ML
INJECTION, SOLUTION INTRAMUSCULAR; INTRAVENOUS
Status: DISPENSED
Start: 2018-07-10

## (undated) RX ORDER — DIPHENHYDRAMINE HYDROCHLORIDE 50 MG/ML
INJECTION INTRAMUSCULAR; INTRAVENOUS
Status: DISPENSED
Start: 2018-07-10